# Patient Record
Sex: MALE | NOT HISPANIC OR LATINO | Employment: FULL TIME | URBAN - METROPOLITAN AREA
[De-identification: names, ages, dates, MRNs, and addresses within clinical notes are randomized per-mention and may not be internally consistent; named-entity substitution may affect disease eponyms.]

---

## 2020-07-15 ENCOUNTER — TRANSCRIBE ORDERS (OUTPATIENT)
Dept: PHYSICAL THERAPY | Facility: CLINIC | Age: 40
End: 2020-07-15

## 2020-07-15 DIAGNOSIS — S46.912A STRAIN OF LEFT SHOULDER, INITIAL ENCOUNTER: Primary | ICD-10-CM

## 2020-07-20 ENCOUNTER — TREATMENT (OUTPATIENT)
Dept: PHYSICAL THERAPY | Facility: CLINIC | Age: 40
End: 2020-07-20

## 2020-07-20 DIAGNOSIS — S46.912A STRAIN OF LEFT SHOULDER, INITIAL ENCOUNTER: ICD-10-CM

## 2020-07-20 DIAGNOSIS — M25.512 ACUTE PAIN OF LEFT SHOULDER: Primary | ICD-10-CM

## 2020-07-20 PROCEDURE — 97161 PT EVAL LOW COMPLEX 20 MIN: CPT | Performed by: PHYSICAL THERAPIST

## 2020-07-20 PROCEDURE — 97014 ELECTRIC STIMULATION THERAPY: CPT | Performed by: PHYSICAL THERAPIST

## 2020-07-20 PROCEDURE — 97530 THERAPEUTIC ACTIVITIES: CPT | Performed by: PHYSICAL THERAPIST

## 2020-07-20 NOTE — PROGRESS NOTES
Physical Therapy Initial Evaluation and Plan of Care      Patient: Emanuel Valdes   : 1980  Diagnosis/ICD-10 Code:  Acute pain of left shoulder [M25.512]  Referring practitioner: MANUEL Ramirez  Date of Initial Visit: 2020  Today's Date: 2020  Patient seen for 1 sessions           Subjective Questionnaire: QuickDASH: 75%      Subjective Evaluation    History of Present Illness  Date of onset: 2020  Mechanism of injury: He was lifting a 30 lb canister at work; had to swat a wasp with his right hand, and the canister yanked his left hand down and behind his back. Pain starts in shoulder and goes into neck and shoots down into arm and hand; fingers tingle and go numb. Doesn't hurt too bad just sitting here, but it hurts as soon as he moves his arm. He has difficulty with daily activities like pushing/pulling a door or driving. He is unable to reach overhead. Taking muscle relaxer and acetaminophen.      Patient Occupation:    Precautions and Work Restrictions: Has not been back to work yetQuality of life: good    Pain  Current pain ratin  At best pain ratin  At worst pain rating: 10  Location: shoulder, radiates down into fingers  Quality: burning, radiating and sharp (tingling; feels like it's on fire)  Relieving factors: change in position and heat  Aggravating factors: lifting, overhead activity, prolonged positioning and sleeping (pulling)  Progression: no change    Hand dominance: right    Diagnostic Tests  X-ray: abnormal (possible muscle sprain)    Patient Goals  Patient goals for therapy: decreased pain and return to work  Patient goal: Be able to move arm without it hurting           Objective          Static Posture     Head  Forward.    Shoulders  Elevated and rounded.    Scapulae  Left elevated.    Palpation   Left   Hypertonic in the cervical paraspinals, deltoid, levator scapulae, pectoralis major, scalenes, suboccipitals and upper trapezius.    Tenderness of the cervical paraspinals, deltoid, levator scapulae, pectoralis major, scalenes, suboccipitals and upper trapezius.     Right   Hypertonic in the cervical paraspinals, levator scapulae, scalenes, suboccipitals and upper trapezius. Tenderness of the cervical paraspinals, levator scapulae, scalenes, suboccipitals and upper trapezius.     Active Range of Motion   Cervical/Thoracic Spine   Cervical    Flexion: WFL  Extension: with pain  Left Shoulder   Flexion: 82 degrees with pain  Extension: 36 degrees with pain  Abduction: 81 degrees with pain  External rotation 0°: 25 degrees     Right Shoulder   Flexion: 179 degrees   Extension: 46 degrees   Abduction: 169 degrees   External rotation 90°: 76 degrees  Internal rotation 90°: 49 degrees           Assessment & Plan     Assessment  Impairments: abnormal muscle tone, abnormal or restricted ROM, activity intolerance and pain with function  Assessment details: Patient is a 39 y.o male presenting with (L) shoulder pain that radiates into his arm and hand, causing paresthesia in his fingers. He reports symptoms 2-10/10, aggravated by any use of his (L) arm in daily activities. He has limited (L) shoulder AROM in all planes, often limited by pain. He exhibits significant hypertonicity of (B) suboccipitals and cervical paraspinals, and is most tender over the (L) UT and LS. Patient's goals for therapy are to decrease pain and regain use of (L) UE in order to return to work. He will benefit from physical therapy to address these deficits and regain functional use of his (L) arm.  Functional Limitations: carrying objects, lifting, sleeping, pulling, pushing, moving in bed, reaching overhead and unable to perform repetitive tasks  Goals  Plan Goals: STG (to be completed in 4 weeks):  1. Patient will be independent with initial HEP.   2. Patient's (L) shoulder AROM will increase to 120 degrees of flexion and abduction without pain to improve use of his (L) UE in  overhead movements.  3. Patient's QuickDASH score will improve by 25% for subjective evidence of symptom reduction.  4. Patient's symptoms of paresthesia will improve by 75% for increased use of his (L) UE in daily activities.  5. Patient's cervical and UE hypertonicity will improve by 50% for evidence of decreased muscle guarding and increased mobility.    LTG (to be completed in 6 weeks):  1. Patient will be independent with progressed strengthening HEP.  2. Patient's (L) shoulder AROM will be WFL without pain in all planes for increased functional use of (L) UE.  3. 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    Plan  Therapy options: will be seen for skilled physical therapy services  Planned modality interventions: cryotherapy, electrical stimulation/Russian stimulation, thermotherapy (hydrocollator packs) and ultrasound  Planned therapy interventions: ADL retraining, body mechanics training, functional ROM exercises, home exercise program, joint mobilization, manual therapy, motor coordination training, fine motor coordination training, postural training, soft tissue mobilization, strengthening, stretching and therapeutic activities  Duration in visits: 14  Treatment plan discussed with: patient        Manual Therapy:         mins  12324;  Therapeutic Exercise:         mins  34367;     Neuromuscular Marti:        mins  30592;    Therapeutic Activity:     12     mins  81572;     Gait Training:           mins  78506;     Ultrasound:          mins  28840;    Electrical Stimulation:    15     mins  39584 ( );  Dry Needling          mins self-pay    Timed Treatment:   12   mins   Total Treatment:     62   mins    PT SIGNATURE: Vidya Gillespie, PT   DATE TREATMENT INITIATED: 7/20/2020    Initial Certification  Certification Period: 10/18/2020  I certify that the therapy services are furnished while this patient is under my  care.  The services outlined above are required by this patient, and will be reviewed every 90 days.     PHYSICIAN: Bekah Norris PA      DATE:     Please sign and return via fax to [phone].. Thank you, [facility].

## 2020-07-21 ENCOUNTER — TREATMENT (OUTPATIENT)
Dept: PHYSICAL THERAPY | Facility: CLINIC | Age: 40
End: 2020-07-21

## 2020-07-21 DIAGNOSIS — M25.512 ACUTE PAIN OF LEFT SHOULDER: Primary | ICD-10-CM

## 2020-07-21 DIAGNOSIS — S46.912A STRAIN OF LEFT SHOULDER, INITIAL ENCOUNTER: ICD-10-CM

## 2020-07-21 PROCEDURE — 97014 ELECTRIC STIMULATION THERAPY: CPT | Performed by: PHYSICAL THERAPIST

## 2020-07-21 PROCEDURE — 97110 THERAPEUTIC EXERCISES: CPT | Performed by: PHYSICAL THERAPIST

## 2020-07-21 NOTE — PROGRESS NOTES
Physical Therapy Daily Progress Note      Visit # 2      Subjective Evaluation    History of Present Illness    Subjective comment: Pt reports that his pain level is 2/10 when he is not using his (L) arm, but shoots up to 7/10 when he does.       Objective   See Exercise, Manual, and Modality Logs for complete treatment.   Added UT/LS stretch, Scapular retraction, Post capsule stretch, and Table slide    Assessment & Plan     Assessment  Assessment details: Pt completed treatment with increased pain from all prescribed exercises.  He was advised to use a pain free ROM for his exercises and was able to finish the exercises.  He responded well to heat and E-stim.                     Manual Therapy:    0     mins  70119;  Therapeutic Exercise:    25     mins  18638;     Neuromuscular Marti:    0    mins  51668;    Therapeutic Activity:     0     mins  65324;     Gait Trainin     mins  47757;     Ultrasound:     0     mins  80419;    Work Hardening           0      mins 05000  Iontophoresis               0   mins 90620    Timed Treatment:   25   mins   Total Treatment:     40   mins    Dexter Medrano PTA  Physical Therapist

## 2020-07-28 ENCOUNTER — TREATMENT (OUTPATIENT)
Dept: PHYSICAL THERAPY | Facility: CLINIC | Age: 40
End: 2020-07-28

## 2020-07-28 DIAGNOSIS — S46.912A STRAIN OF LEFT SHOULDER, INITIAL ENCOUNTER: ICD-10-CM

## 2020-07-28 DIAGNOSIS — M25.512 ACUTE PAIN OF LEFT SHOULDER: Primary | ICD-10-CM

## 2020-07-28 PROCEDURE — 97110 THERAPEUTIC EXERCISES: CPT | Performed by: PHYSICAL THERAPIST

## 2020-07-28 PROCEDURE — 97014 ELECTRIC STIMULATION THERAPY: CPT | Performed by: PHYSICAL THERAPIST

## 2020-07-28 NOTE — PROGRESS NOTES
Physical Therapy Daily Progress Note      Visit # 3      Subjective Evaluation    History of Present Illness    Subjective comment: Patient reports that he is feeling approximately 50-60% better and able to do light tasks throughout the day.  Still unable to do heavier tasks.        Objective   See Exercise, Manual, and Modality Logs for complete treatment.       Assessment & Plan     Assessment  Assessment details: Patient tolerated treatment well.  Continues to have limited AROM above shoulder and 45 degrees ER at neutral.      Plan  Plan details: Progress as tolerated.                      Manual Therapy:    0     mins  71740;  Therapeutic Exercise:    45     mins  03238;     Neuromuscular Marti:    0    mins  39438;    Therapeutic Activity:     0     mins  45191;     Gait Trainin     mins  81727;     Ultrasound:     0     mins  98677;    Work Hardening           0      mins 55147  Iontophoresis               0   mins 22097    Timed Treatment:   45   mins   Total Treatment:     60   mins    Vidya Gillespie, PT  Physical Therapist

## 2020-08-11 ENCOUNTER — TREATMENT (OUTPATIENT)
Dept: PHYSICAL THERAPY | Facility: CLINIC | Age: 40
End: 2020-08-11

## 2020-08-11 DIAGNOSIS — M25.512 ACUTE PAIN OF LEFT SHOULDER: Primary | ICD-10-CM

## 2020-08-11 DIAGNOSIS — S46.912A STRAIN OF LEFT SHOULDER, INITIAL ENCOUNTER: ICD-10-CM

## 2020-08-11 PROCEDURE — 97110 THERAPEUTIC EXERCISES: CPT | Performed by: PHYSICAL THERAPIST

## 2020-08-11 PROCEDURE — 97140 MANUAL THERAPY 1/> REGIONS: CPT | Performed by: PHYSICAL THERAPIST

## 2020-08-18 ENCOUNTER — TREATMENT (OUTPATIENT)
Dept: PHYSICAL THERAPY | Facility: CLINIC | Age: 40
End: 2020-08-18

## 2020-08-18 DIAGNOSIS — M25.512 ACUTE PAIN OF LEFT SHOULDER: Primary | ICD-10-CM

## 2020-08-18 DIAGNOSIS — S46.912A STRAIN OF LEFT SHOULDER, INITIAL ENCOUNTER: ICD-10-CM

## 2020-08-18 PROCEDURE — 97110 THERAPEUTIC EXERCISES: CPT | Performed by: PHYSICAL THERAPIST

## 2020-08-18 NOTE — PROGRESS NOTES
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       Objective   See Exercise, Manual, and Modality Logs for complete treatment.   Added Rhomboid stretch/ Tband row and shoulder flex/scaption     Assessment & Plan     Assessment  Assessment details: Pt completed treatment with increasing discomfort in (L) shoulder as it fatigued.    He tolerated the introduction of resisted rows without issue.  He is still hypertonic in upper thoracic and shoulder which limits his ROM.  He will benefit from continued therapy to increase his ROM.                     Manual Therapy:    0     mins  25852;  Therapeutic Exercise:    32     mins  84722;     Neuromuscular Marti:    0    mins  90128;    Therapeutic Activity:     0     mins  68826;     Gait Trainin     mins  96515;     Ultrasound:     0     mins  76832;    Work Hardening           0      mins 49777  Iontophoresis               0   mins 12394    Timed Treatment:   32   mins   Total Treatment:     32   mins    Dexter Medrano PTA  Physical Therapist

## 2020-08-25 ENCOUNTER — TREATMENT (OUTPATIENT)
Dept: PHYSICAL THERAPY | Facility: CLINIC | Age: 40
End: 2020-08-25

## 2020-08-25 DIAGNOSIS — M25.512 ACUTE PAIN OF LEFT SHOULDER: Primary | ICD-10-CM

## 2020-08-25 DIAGNOSIS — S46.912A STRAIN OF LEFT SHOULDER, INITIAL ENCOUNTER: ICD-10-CM

## 2020-08-25 PROCEDURE — 97110 THERAPEUTIC EXERCISES: CPT | Performed by: PHYSICAL THERAPIST

## 2020-08-25 NOTE — PROGRESS NOTES
Physical Therapy Daily Progress Note      Visit # 6      Subjective Evaluation    History of Present Illness    Subjective comment: Pt reports that he has been referred for a MRI  for his (L) shoulder.       Objective   See Exercise, Manual, and Modality Logs for complete treatment.   Added Prone I    Assessment & Plan     Assessment  Assessment details: Pt's pain level alka from 2/10 at start of treatment to 5/10.  Scapular exercises  are relatively pain free.  Inferior and posterior capsule stretches are most painful.                     Manual Therapy:    0     mins  92452;  Therapeutic Exercise:    32     mins  59020;     Neuromuscular Marti:    0    mins  74975;    Therapeutic Activity:     0     mins  56659;     Gait Trainin     mins  56734;     Ultrasound:     0     mins  94412;    Work Hardening           0      mins 59660  Iontophoresis               0   mins 63752    Timed Treatment:   32   mins   Total Treatment:     32   mins    Dexter Medrano PTA  Physical Therapist

## 2020-09-08 ENCOUNTER — TREATMENT (OUTPATIENT)
Dept: PHYSICAL THERAPY | Facility: CLINIC | Age: 40
End: 2020-09-08

## 2020-09-08 DIAGNOSIS — S46.912A STRAIN OF LEFT SHOULDER, INITIAL ENCOUNTER: ICD-10-CM

## 2020-09-08 DIAGNOSIS — M25.512 ACUTE PAIN OF LEFT SHOULDER: Primary | ICD-10-CM

## 2020-09-08 PROCEDURE — 97110 THERAPEUTIC EXERCISES: CPT | Performed by: PHYSICAL THERAPIST

## 2020-09-08 NOTE — PROGRESS NOTES
Physical Therapy Daily Progress Note      Visit # 7      Subjective Evaluation    History of Present Illness    Subjective comment: Patient reports that his shoulder is about the same and does not feel any better. States that since yesterday when he was doing his exericses it feels worse.  He is scheduled to have an MRI next week.        Objective   See Exercise, Manual, and Modality Logs for complete treatment.       Assessment & Plan     Assessment  Assessment details: Patient tolerated treatment fair.  He continues to have decreased ROM with inferior and posterior capsular tightness.  ROM Slightly improved.  Increased soreness following treatment.     Plan  Plan details: Progress as tolerated.                      Manual Therapy:    0     mins  16572;  Therapeutic Exercise:    45     mins  38375;     Neuromuscular Marti:    0    mins  69491;    Therapeutic Activity:     0     mins  69256;     Gait Trainin     mins  23927;     Ultrasound:     0     mins  38500;    Work Hardening           0      mins 60127  Iontophoresis               0   mins 30616    Timed Treatment:   45   mins   Total Treatment:     55   mins    Vidya Gillespie, PT  Physical Therapist

## 2020-09-11 ENCOUNTER — TREATMENT (OUTPATIENT)
Dept: PHYSICAL THERAPY | Facility: CLINIC | Age: 40
End: 2020-09-11

## 2020-09-11 DIAGNOSIS — S46.912A STRAIN OF LEFT SHOULDER, INITIAL ENCOUNTER: ICD-10-CM

## 2020-09-11 DIAGNOSIS — M25.512 ACUTE PAIN OF LEFT SHOULDER: Primary | ICD-10-CM

## 2020-09-11 PROCEDURE — 97110 THERAPEUTIC EXERCISES: CPT | Performed by: PHYSICAL THERAPIST

## 2020-09-11 NOTE — PROGRESS NOTES
Physical Therapy Daily Progress Note      Visit # 8      Subjective Evaluation    History of Present Illness    Subjective comment: Pt reports his shoulder has been very painful grabbing a falling cup over the weekend.       Objective   See Exercise, Manual, and Modality Logs for complete treatment.       Assessment & Plan     Assessment  Assessment details: Pt completed treatment with increased pain in (L) shoulder.  Posterior and inferior capsule stretches were too painful to complete.  Progress is limited by continued high pain level in shoulder.                      Manual Therapy:    0     mins  32206;  Therapeutic Exercise:    30     mins  85859;     Neuromuscular Marti:    0    mins  30871;    Therapeutic Activity:     0     mins  82230;     Gait Trainin     mins  94943;     Ultrasound:     0     mins  00068;    Work Hardening           0      mins 07171  Iontophoresis               0   mins 12816    Timed Treatment:   30   mins   Total Treatment:     30   mins    Dexter Medrano PTA  Physical Therapist

## 2020-09-17 ENCOUNTER — TREATMENT (OUTPATIENT)
Dept: PHYSICAL THERAPY | Facility: CLINIC | Age: 40
End: 2020-09-17

## 2020-09-17 DIAGNOSIS — M25.512 ACUTE PAIN OF LEFT SHOULDER: Primary | ICD-10-CM

## 2020-09-17 DIAGNOSIS — S46.912A STRAIN OF LEFT SHOULDER, INITIAL ENCOUNTER: ICD-10-CM

## 2020-09-17 PROCEDURE — 97110 THERAPEUTIC EXERCISES: CPT | Performed by: PHYSICAL THERAPIST

## 2020-09-17 NOTE — PROGRESS NOTES
"Physical Therapy Daily Progress Note      Visit # 9      Subjective Evaluation    History of Present Illness    Subjective comment: Pt reports that he had his MRI on Monday, but has not got the results yet.       Objective   See Exercise, Manual, and Modality Logs for complete treatment.       Assessment & Plan     Assessment  Assessment details: Pt tolerated treatment with slight increase in shoulder pain.  He displayed visible signs of pain on several exercises.  When asked it he had pain he replied \"a little\".  He had no issues with introduction of hitch hiker and TB pull aparts.                     Manual Therapy:    0     mins  38259;  Therapeutic Exercise:    41     mins  76180;     Neuromuscular Marti:    0    mins  78665;    Therapeutic Activity:     0     mins  24450;     Gait Trainin     mins  41767;     Ultrasound:     0     mins  14415;    Work Hardening           0      mins 06980  Iontophoresis               0   mins 67025    Timed Treatment:   41   mins   Total Treatment:     41   mins    Dexter Medrano PTA  Physical Therapist  "

## 2020-09-22 ENCOUNTER — TREATMENT (OUTPATIENT)
Dept: PHYSICAL THERAPY | Facility: CLINIC | Age: 40
End: 2020-09-22

## 2020-09-22 DIAGNOSIS — S46.912A STRAIN OF LEFT SHOULDER, INITIAL ENCOUNTER: ICD-10-CM

## 2020-09-22 DIAGNOSIS — M25.512 ACUTE PAIN OF LEFT SHOULDER: Primary | ICD-10-CM

## 2020-09-22 PROCEDURE — 97110 THERAPEUTIC EXERCISES: CPT | Performed by: PHYSICAL THERAPIST

## 2020-09-22 NOTE — PROGRESS NOTES
Physical Therapy Daily Progress Note      Visit # 10      Subjective Evaluation    History of Present Illness    Subjective comment: Pt reports that the back of his (L) shoulder has been sore since he had his MRI last week.       Objective          Active Range of Motion   Left Shoulder   Flexion: 105 degrees with pain  Abduction: 94 degrees with pain  External rotation 90°: 34 degrees with pain  Internal rotation 90°: 37 degrees with pain      See Exercise, Manual, and Modality Logs for complete treatment.       Assessment & Plan     Assessment  Assessment details: Pt completed treatment with c/o mild pain on all exercises. Pt's AROM still very limited.  Progression has been limited by  continued pain at end ranges.                     Manual Therapy:    0     mins  99173;  Therapeutic Exercise:    38     mins  12249;     Neuromuscular Marti:    0    mins  06353;    Therapeutic Activity:     0     mins  13834;     Gait Trainin     mins  34121;     Ultrasound:     0     mins  44091;    Work Hardening           0      mins 56957  Iontophoresis               0   mins 05875  E-Stim                          _0_ mins 45139 ( )    Timed Treatment:   38   mins   Total Treatment:     38   mins    Dexter Medrano PTA  Physical Therapist Assistant

## 2020-09-29 ENCOUNTER — TREATMENT (OUTPATIENT)
Dept: PHYSICAL THERAPY | Facility: CLINIC | Age: 40
End: 2020-09-29

## 2020-09-29 DIAGNOSIS — M25.512 ACUTE PAIN OF LEFT SHOULDER: Primary | ICD-10-CM

## 2020-09-29 DIAGNOSIS — S46.912A STRAIN OF LEFT SHOULDER, INITIAL ENCOUNTER: ICD-10-CM

## 2020-09-29 PROCEDURE — 97110 THERAPEUTIC EXERCISES: CPT | Performed by: PHYSICAL THERAPIST

## 2020-09-29 NOTE — PROGRESS NOTES
Physical Therapy Daily Progress Note      Visit # 11      Subjective Evaluation    History of Present Illness    Subjective comment: Pt reports that he has been referred to a specialist for adhesive capsulitis.       Objective   See Exercise, Manual, and Modality Logs for complete treatment.       Assessment & Plan     Assessment  Assessment details: Pt completed treatment with increased pain for all exercises.  Discussed with pt the importance of keeping his shoulder moving no matter how bad it hurts.  Will continue to progress as tolerated.                     Manual Therapy:    0     mins  09892;  Therapeutic Exercise:    40     mins  30900;     Neuromuscular Marti:    0    mins  81680;    Therapeutic Activity:     0     mins  87330;     Gait Trainin     mins  84419;     Ultrasound:     0     mins  77033;    Work Hardening           0      mins 89168  Iontophoresis               0   mins 21859  E-Stim                          _0_ mins 57263 ( )    Timed Treatment:   40   mins   Total Treatment:     40   mins    Dexter Medrano PTA  Physical Therapist Assistant

## 2020-10-01 ENCOUNTER — TREATMENT (OUTPATIENT)
Dept: PHYSICAL THERAPY | Facility: CLINIC | Age: 40
End: 2020-10-01

## 2020-10-01 DIAGNOSIS — S46.912A STRAIN OF LEFT SHOULDER, INITIAL ENCOUNTER: ICD-10-CM

## 2020-10-01 DIAGNOSIS — M25.512 ACUTE PAIN OF LEFT SHOULDER: Primary | ICD-10-CM

## 2020-10-01 PROCEDURE — 97110 THERAPEUTIC EXERCISES: CPT | Performed by: PHYSICAL THERAPIST

## 2020-10-01 NOTE — PROGRESS NOTES
Physical Therapy Daily Progress Note      Visit # 12      Subjective Evaluation    History of Present Illness    Subjective comment: Pt reports current pain level of 2/10.       Objective   See Exercise, Manual, and Modality Logs for complete treatment.       Assessment & Plan     Assessment  Assessment details: Pt tolerated treatment well.  He continues to have increased pain as treatment progresses.  Pt benefits from reminders to not compensate by raising his shoulder during exercises.                     Manual Therapy:    0     mins  37606;  Therapeutic Exercise:    42     mins  43298;     Neuromuscular Marti:    0    mins  88677;    Therapeutic Activity:     0     mins  32209;     Gait Trainin     mins  60768;     Ultrasound:     0     mins  68949;    Work Hardening           0      mins 31797  Iontophoresis               0   mins 67113  E-Stim                          _0_ mins 32760 ( )    Timed Treatment:   42   mins   Total Treatment:     42   mins    Dexter Medrano PTA  Physical Therapist Assistant

## 2020-10-05 ENCOUNTER — TREATMENT (OUTPATIENT)
Dept: PHYSICAL THERAPY | Facility: CLINIC | Age: 40
End: 2020-10-05

## 2020-10-05 DIAGNOSIS — S46.912A STRAIN OF LEFT SHOULDER, INITIAL ENCOUNTER: ICD-10-CM

## 2020-10-05 DIAGNOSIS — M25.512 ACUTE PAIN OF LEFT SHOULDER: Primary | ICD-10-CM

## 2020-10-05 PROCEDURE — 97110 THERAPEUTIC EXERCISES: CPT | Performed by: PHYSICAL THERAPIST

## 2020-10-05 NOTE — PROGRESS NOTES
Re-Assessment / Re-Certification      Patient: Emanuel Valdes   : 1980  Diagnosis/ICD-10 Code:  Acute pain of left shoulder [M25.512]  Referring practitioner: MANUEL Ramirez  Today's Date: 10/5/2020  Patient seen for 13 sessions      Subjective:   Emanuel Valdes reports: that the shoulder is better overall, rates the pain at 2/10.    Treatment has included: therapeutic exercise, electrical stimulation and moist heat    Subjective   Objective          Active Range of Motion   Left Shoulder   Flexion: 103 degrees   Abduction: 78 degrees   External rotation 0°: 11 degrees   Internal rotation BTB: Active internal rotation behind the back: to left lateral hip.       Assessment & Plan     Assessment  Impairments: abnormal or restricted ROM, activity intolerance, lacks appropriate home exercise program and pain with function  Assessment details: Patient presents with c/o pain and limited AROM which is limiting his ability to perform full job duties and ADL'S.    Prognosis details: STG's   1)  Independent with HEP  2)  Decrease pain by 50% or more  3)  AROM left shoulder flexion to 115  4)  AROM left shoulder abd to 90    LTG's  1)  Independent with HEP progression  2)  Decrease pain by 75% or more  3)  AROM left shoulder flexion to 145  4)  AROM left shoulder abduction to 115  5)  AROM left shoulder ER to 40       Plan  Therapy options: will be seen for skilled physical therapy services  Planned therapy interventions: stretching, therapeutic activities, home exercise program and strengthening        PT Signature: Edgardo Wilkinson, PT      Manual Therapy:    0     mins  46873;  Therapeutic Exercise:    38     mins  40181;     Neuromuscular Marti:    0    mins  38046;    Therapeutic Activity:     0     mins  03980;     Gait Trainin     mins  63731;     Ultrasound:     0     mins  17178;    Work Hardening           0      mins 00835  Iontophoresis               0   mins 46936    Timed Treatment:   38   mins    Total Treatment:     38   mins

## 2020-10-13 ENCOUNTER — TREATMENT (OUTPATIENT)
Dept: PHYSICAL THERAPY | Facility: CLINIC | Age: 40
End: 2020-10-13

## 2020-10-13 DIAGNOSIS — M25.512 ACUTE PAIN OF LEFT SHOULDER: Primary | ICD-10-CM

## 2020-10-13 DIAGNOSIS — S46.912A STRAIN OF LEFT SHOULDER, INITIAL ENCOUNTER: ICD-10-CM

## 2020-10-13 PROCEDURE — 97110 THERAPEUTIC EXERCISES: CPT | Performed by: PHYSICAL THERAPIST

## 2020-10-13 NOTE — PROGRESS NOTES
Physical Therapy Daily Progress Note      Visit # 14      Subjective Evaluation    History of Present Illness    Subjective comment: Pt reports that his shoulder hurts all the time, but he has been doing his HEP.       Objective   See Exercise, Manual, and Modality Logs for complete treatment.   Added back circles, adrian elmore    Assessment & Plan     Assessment  Assessment details: Pt tolerated treatment with increased levels of pain with all movements.  Pt's AROM is still very limited by pain. Pt reports that he has an appointment with an orthopedic on 10/26.                     Manual Therapy:    0     mins  79687;  Therapeutic Exercise:    23     mins  55770;     Neuromuscular Marti:    0    mins  23153;    Therapeutic Activity:     0     mins  53375;     Gait Trainin     mins  99886;     Ultrasound:     0     mins  30833;    Work Hardening           0      mins 75531  Iontophoresis               0   mins 52636  E-Stim                          _0_ mins 91562 ( )    Timed Treatment:   23   mins   Total Treatment:     23   mins    Dexter Medrano PTA  Physical Therapist Assistant

## 2020-10-26 ENCOUNTER — OFFICE VISIT (OUTPATIENT)
Dept: ORTHOPEDIC SURGERY | Facility: CLINIC | Age: 40
End: 2020-10-26

## 2020-10-26 VITALS — WEIGHT: 246 LBS | BODY MASS INDEX: 36.43 KG/M2 | HEIGHT: 69 IN | TEMPERATURE: 96 F

## 2020-10-26 DIAGNOSIS — M25.512 LEFT SHOULDER PAIN, UNSPECIFIED CHRONICITY: Primary | ICD-10-CM

## 2020-10-26 DIAGNOSIS — M75.02 ADHESIVE CAPSULITIS OF LEFT SHOULDER: ICD-10-CM

## 2020-10-26 PROCEDURE — 73030 X-RAY EXAM OF SHOULDER: CPT | Performed by: ORTHOPAEDIC SURGERY

## 2020-10-26 PROCEDURE — 20610 DRAIN/INJ JOINT/BURSA W/O US: CPT | Performed by: ORTHOPAEDIC SURGERY

## 2020-10-26 PROCEDURE — 99204 OFFICE O/P NEW MOD 45 MIN: CPT | Performed by: ORTHOPAEDIC SURGERY

## 2020-10-26 RX ORDER — METHYLPREDNISOLONE ACETATE 80 MG/ML
80 INJECTION, SUSPENSION INTRA-ARTICULAR; INTRALESIONAL; INTRAMUSCULAR; SOFT TISSUE
Status: COMPLETED | OUTPATIENT
Start: 2020-10-26 | End: 2020-10-26

## 2020-10-26 RX ADMIN — METHYLPREDNISOLONE ACETATE 80 MG: 80 INJECTION, SUSPENSION INTRA-ARTICULAR; INTRALESIONAL; INTRAMUSCULAR; SOFT TISSUE at 12:33

## 2020-10-26 NOTE — PROGRESS NOTES
New Left Shoulder      Patient: Emanuel Valdes        YOB: 1980    Medical Record Number: [id number]        Chief Complaints: left shoulder pain      History of Present Illness: This is a 40-year-old male who is right-hand-dominant presents with left shoulder injury that happened 3 months ago.  He was loading propane tanks onto a front  he had a full one in his left hand when nest of wasp fluid him he swung the propane tank back behind him still hanging onto the left shoulder.  To progressive worsening of his symptoms loss of range of motion his current symptoms are shooting grinding they are severe nearly constant but worse with activity somewhat better with rest he does have night pain past medical history is marked for diabetes      Allergies: No Known Allergies    Medications:   Home Medications:  Current Outpatient Medications on File Prior to Visit   Medication Sig   • diclofenac (VOLTAREN) 50 MG EC tablet TK 1 T PO Q 8 H WITH FOOD     No current facility-administered medications on file prior to visit.      Current Medications:  Scheduled Meds:  Continuous Infusions:No current facility-administered medications for this visit.     PRN Meds:.    History reviewed. No pertinent past medical history.   History reviewed. No pertinent surgical history.     Social History     Occupational History   • Not on file   Tobacco Use   • Smoking status: Never Smoker   • Smokeless tobacco: Never Used   Substance and Sexual Activity   • Alcohol use: Not Currently   • Drug use: Never   • Sexual activity: Not on file      Social History     Social History Narrative   • Not on file      History reviewed. No pertinent family history.          Review of Systems: 14 point review of systems are remarkable for shoulder pain only the remainder negative per the patient    Review of Systems      Physical Exam: 40 y.o. male  General Appearance:    Alert, cooperative, in no acute distress                  "  Vitals:    10/26/20 1209   Temp: 96 °F (35.6 °C)   TempSrc: Temporal   Weight: 112 kg (246 lb)   Height: 175.3 cm (69\")   PainSc:   4      Patient is alert and read ×3 no acute distress appears her above-listed at height weight and age.  Affect is normal respiratory rate is normal unlabored. Heart rate regular rate rhythm, sclera, dentition and hearing are normal for the purpose of this exam.    Ortho Exam Physical exam of the left shoulder reveals no overlying skin changes no lymphedema no lymphadenopathy.  Patient has active flexion 150 with mild symptoms passively I can get them to about 160 abduction is similar external rotation is to 0 and internal rotation to their buttocks with  symptoms.  Patient has good rotator cuff strength 4+ over 5 with isometric strength testing with pain.  Patient has a positive impingement and a positive Moran sign.  Patient has good cervical range of motion which is full and asymptomatic no radicular symptoms.  Patient has a normal elbow exam.  Good distal pulses are present    Large Joint Arthrocentesis: L glenohumeral  Date/Time: 10/26/2020 12:33 PM  Consent given by: patient  Site marked: site marked  Timeout: Immediately prior to procedure a time out was called to verify the correct patient, procedure, equipment, support staff and site/side marked as required   Supporting Documentation  Indications: pain   Procedure Details  Location: shoulder - L glenohumeral  Preparation: Patient was prepped and draped in the usual sterile fashion  Needle gauge: 21.  Approach: anterolateral  Medications administered: 4 mL lidocaine (cardiac); 80 mg methylPREDNISolone acetate 80 MG/ML  Patient tolerance: patient tolerated the procedure well with no immediate complications                Radiology:   AP, Scapular Y and Axillary Lateral of the left shoulder were ordered/reviewed to evauate shoulder pain.  I have no comparative films he has some mild acromioclavicular arthritis and some subtle " sclerosis at the insertion the cuff otherwise no acute bony pathology he also comes with an MRI which I have reviewed which show some tendinitis of rotator cuff as well as some acromioclavicular arthritis no other acute pathology  Imaging Results (Most Recent)     Procedure Component Value Units Date/Time    XR Shoulder 2+ View Left [[id number]] Resulted: 10/26/20 1202     Updated: 10/26/20 1202    Impression:      Ordering physician's impression is located in the Encounter Note dated 10/26/20. X-ray performed in the DR room.          Assessment/Plan: Left shoulder pain I think this is a straightforward frozen shoulder which I discussed with in detail I think he benefit from a glenohumeral injection he is done some physical therapy but I think an injection in the therapy would help.  I will have him off work today and then continue his limited work  Cortisone Injection. See procedure note.  Cortisone Injection for DIAGNOSTIC and THERAPUTIC purposes.

## 2020-11-03 ENCOUNTER — TREATMENT (OUTPATIENT)
Dept: PHYSICAL THERAPY | Facility: CLINIC | Age: 40
End: 2020-11-03

## 2020-11-03 DIAGNOSIS — M75.02 ADHESIVE CAPSULITIS OF LEFT SHOULDER: Primary | ICD-10-CM

## 2020-11-03 PROCEDURE — 97110 THERAPEUTIC EXERCISES: CPT | Performed by: PHYSICAL THERAPIST

## 2020-11-03 NOTE — PROGRESS NOTES
Re-Assessment / Re-Certification    Time In 344     Patient: Emanuel Valdes   : 1980  Diagnosis/ICD-10 Code:  Adhesive capsulitis of left shoulder [M75.02]  Referring practitioner: Kelly Starks MD  Today's Date: 11/3/2020  Patient seen for 15 sessions      Subjective:       Subjective Evaluation    History of Present Illness  Mechanism of injury: See prev eval.  Patient saw Dr Starks who injected the shoulder.    Pain  Current pain rating: 3  At worst pain rating: 10  Location: left lateral shoulder and arm         Objective          Active Range of Motion   Left Shoulder   Flexion: 100 degrees   Abduction: 75 degrees   External rotation 0°: 26 degrees   Internal rotation BTB: Active internal rotation behind the back: to left glut.     Strength/Myotome Testing     Left Shoulder     Planes of Motion   Flexion: 4   Abduction: 4+       Assessment & Plan     Assessment  Impairments: abnormal or restricted ROM, activity intolerance, impaired physical strength, lacks appropriate home exercise program and pain with function  Assessment details: Patient presents with c/o pain, limited AROM and decreased strength which is limiting his ability to perform full job duties.  Barriers to therapy: none  Prognosis: good  Prognosis details: STG's  1)  Independent with HEP  2)  Decrease pain by 50% or more  3)  AROM left shoulder flexion to 120  4)  AROM left shoulder abduction to 100    LTG's   1)  Independent with HEP progression  2)  Decrease pain by 75% or more  3)  Increase strength for the left shoulder to 4+/5  4)  AROM left shoulder flexion to 150  5)  AROM left shoulder abduction to 130  6)  AROM left shoulder ER to 50      Plan  Therapy options: will be seen for skilled physical therapy services  Planned therapy interventions: strengthening, stretching, therapeutic activities and home exercise program  Treatment plan discussed with: patient          PT Signature: Edgardo Wilkinson, PT          Manual Therapy:     0     mins  66681;  Therapeutic Exercise:    27     mins  69410;     Neuromuscular Marti:    0    mins  72162;    Therapeutic Activity:     0     mins  01011;     Gait Trainin     mins  65556;     Ultrasound:     0     mins  14350;    Work Hardening           0      mins 03234  Iontophoresis               0   mins 18504    Timed Treatment:   27   mins   Total Treatment:     27   mins

## 2020-11-10 ENCOUNTER — OFFICE VISIT (OUTPATIENT)
Dept: ORTHOPEDIC SURGERY | Facility: CLINIC | Age: 40
End: 2020-11-10

## 2020-11-10 ENCOUNTER — TELEPHONE (OUTPATIENT)
Dept: ORTHOPEDIC SURGERY | Facility: CLINIC | Age: 40
End: 2020-11-10

## 2020-11-10 VITALS — TEMPERATURE: 96.8 F | WEIGHT: 242 LBS | HEIGHT: 69 IN | BODY MASS INDEX: 35.84 KG/M2

## 2020-11-10 DIAGNOSIS — M75.02 ADHESIVE CAPSULITIS OF LEFT SHOULDER: Primary | ICD-10-CM

## 2020-11-10 PROCEDURE — 99213 OFFICE O/P EST LOW 20 MIN: CPT | Performed by: ORTHOPAEDIC SURGERY

## 2020-11-10 NOTE — TELEPHONE ENCOUNTER
Caller: LENKA    Relationship: [employer]     Best call back number: [phone]    What form or medical record are you requesting: [employer] NOTES    Who is requesting this form or medical record from you: [employer]    How would you like to receive the form or medical records (pick-up, mail, fax):   If fax, what is the fax number: [phone]      Timeframe paperwork needed: ASAP    Additional notes: LENKA FROM [facility] CALLED AND NEEDS UPDATED NOTES FOR FINN [facility]LANDO' LAST APPOINTMENT 11/10 AND NEEDS TO KNOW IF THERE WILL BE A FOLLOW UP. PLEASE FAX NOTES.

## 2020-11-10 NOTE — PROGRESS NOTES
Patient: Emanuel Valdes  YOB: 1980  Date of Service: 11/10/2020    Chief Complaints: Left shoulder pain    Subjective:    History of Present Illness: Pt is seen in the office today with complaints of left shoulder pain I last saw him he was felt to have frozen shoulder we did inject him he is done physical therapy is actually improving.  But only a little bit.  He still quite stuck despite the fact has been diligent with his physical therapy        Allergies: No Known Allergies    Medications:   Home Medications:  Current Outpatient Medications on File Prior to Visit   Medication Sig   • diclofenac (VOLTAREN) 50 MG EC tablet TK 1 T PO Q 8 H WITH FOOD     No current facility-administered medications on file prior to visit.      Current Medications:  Scheduled Meds:  Continuous Infusions:No current facility-administered medications for this visit.     PRN Meds:.    I have reviewed the patient's medical history in detail and updated the computerized patient record.  Review and summarization of old records include:    No past medical history on file.   No past surgical history on file.     Social History     Occupational History   • Not on file   Tobacco Use   • Smoking status: Never Smoker   • Smokeless tobacco: Never Used   Substance and Sexual Activity   • Alcohol use: Not Currently   • Drug use: Never   • Sexual activity: Not on file      Social History     Social History Narrative   • Not on file      No family history on file.    ROS: 14 point review of systems was performed and was negative except for documented findings in HPI and today's encounter.     Allergies: No Known Allergies  Constitutional:  Denies fever, shaking or chills   Eyes:  Denies change in visual acuity   HENT:  Denies nasal congestion or sore throat   Respiratory:  Denies cough or shortness of breath   Cardiovascular:  Denies chest pain or severe LE edema   GI:  Denies abdominal pain, nausea, vomiting, bloody stools or  "diarrhea   Musculoskeletal:  Numbness, tingling, or loss of motor function only as noted above in history of present illness.  : Denies painful urination or hematuria  Integument:  Denies rash, lesion or ulceration   Neurologic:  Denies headache or focal weakness  Endocrine:  Denies lymphadenopathy  Psych:  Denies confusion or change in mental status   Hem:  Denies active bleeding      Physical Exam: 40 y.o. male  Wt Readings from Last 3 Encounters:   10/26/20 112 kg (246 lb)       There is no height or weight on file to calculate BMI.  No height and weight on file for this encounter.  There were no vitals filed for this visit.  Vital signs reviewed.   General Appearance:    Alert, cooperative, in no acute distress                    Ortho exam      Physical exam of the left shoulder reveals no overlying skin changes no lymphedema no lymphadenopathy.  Patient has active flexion 150 with mild symptoms passively I can get them to about 160 abduction is similar external rotation is to 0 and internal rotation to their buttocks with  symptoms.  Patient has good rotator cuff strength 4+ over 5 with isometric strength testing with pain.  Patient has a positive impingement and a positive Moran sign.  Patient has good cervical range of motion which is full and asymptomatic no radicular symptoms.  Patient has a normal elbow exam.  Good distal pulses are present       Physical Exam: 40 y.o. male  General Appearance:    Alert, cooperative, in no acute distress                      Vitals:    11/10/20 1450   Temp: 96.8 °F (36 °C)   Weight: 110 kg (242 lb)   Height: 175.3 cm (69\")        Head:    Normocephalic, without obvious abnormality, atraumatic   Eyes:            conjunctivae and sclerae normal, no pallor, corneas clear,    Ears:    Ears appear intact with no abnormalities noted   Throat:   No oral lesions, no thrush, oral mucosa moist   Neck:   No adenopathy, supple, trachea midline, no thyromegaly,    Back:     No " kyphosis present, no scoliosis present, no skin lesions,      erythema or scars, no tenderness to percussion or                   palpation,   range of motion normal   Lungs:     Clear to auscultation,respirations regular, even and                  unlabored    Heart:    Regular rhythm and normal rate               Chest Wall:    No abnormalities observed               Pulses:   Pulses palpable and equal bilaterally   Skin:   No bleeding, bruising or rash   Lymph nodes:   No palpable adenopathy   Neurologic:   Appears neurologic intact           Assessment: Left shoulder adhesive capsulitis.  He does have an MRI which shows some tendinopathy the rotator cuff no obvious tear.  I think he clearly a frozen shoulder.  He is improved a very little bit with conservative management but still quite stuck I think the best thing to do for this patient is to proceed with a manipulation.  I have discussed this with him in detail including the risk of fracture and dislocation.    Plan: Plan is to proceed with manipulation and injection he understands will do therapy immediately after that he understands the inherent risk of fracture dislocation.  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  I will give him a new work note that eliminates that.  Follow up as indicated.  Ice, elevate, and rest as needed.  Discussed conservative measures of pain control including ice, bracing.  Also talked about the importance of strengthening and maintaining ideal body weight    Kelly Starks M.D.

## 2020-11-11 ENCOUNTER — TELEPHONE (OUTPATIENT)
Dept: ORTHOPEDIC SURGERY | Facility: CLINIC | Age: 40
End: 2020-11-11

## 2020-11-11 NOTE — TELEPHONE ENCOUNTER
Caller: LENKA ROBERTSON    Relationship: [employer]    Best call back number: [phone]    What form or medical record are you requesting: OFFICE NOTES FROM 11-10-20    Who is requesting this form or medical record from you: [phone]    How would you like to receive the form or medical records (pick-up, mail, fax):  ATTN: LENKA ROBERTSON  FAX: [phone]      Timeframe paperwork needed: AS SOON AS POSSIBLE.

## 2020-11-12 ENCOUNTER — TREATMENT (OUTPATIENT)
Dept: PHYSICAL THERAPY | Facility: CLINIC | Age: 40
End: 2020-11-12

## 2020-11-12 DIAGNOSIS — M25.512 ACUTE PAIN OF LEFT SHOULDER: ICD-10-CM

## 2020-11-12 DIAGNOSIS — M75.02 ADHESIVE CAPSULITIS OF LEFT SHOULDER: Primary | ICD-10-CM

## 2020-11-12 DIAGNOSIS — S46.912A STRAIN OF LEFT SHOULDER, INITIAL ENCOUNTER: ICD-10-CM

## 2020-11-12 PROCEDURE — 97110 THERAPEUTIC EXERCISES: CPT | Performed by: PHYSICAL THERAPIST

## 2020-11-12 NOTE — PROGRESS NOTES
Physical Therapy Daily Progress Note      Visit # 16      Subjective Evaluation    History of Present Illness    Subjective comment: Pt reports that he will have a manipulation under anesthesia next week, but has not been scheduled yet.       Objective   See Exercise, Manual, and Modality Logs for complete treatment.       Assessment & Plan     Assessment  Assessment details: Pt tolerate treatment fairly well.  He had some c/o of soreness in (L) shoulder after wall and table slides.  He was able to complete the rest of his program with no issues.                     Manual Therapy:    0     mins  71807;  Therapeutic Exercise:    27     mins  51937;     Neuromuscular Marti:    0    mins  15419;    Therapeutic Activity:     0     mins  69764;     Gait Trainin     mins  00004;     Ultrasound:     0     mins  87970;    Work Hardening           0      mins 69481  Iontophoresis               0   mins 77475  E-Stim                          _0_ mins 37243 ( )    Timed Treatment:   27   mins   Total Treatment:     27   mins    Dexter Medrano PTA  Physical Therapist Assistant

## 2020-11-30 ENCOUNTER — DOCUMENTATION (OUTPATIENT)
Dept: PHYSICAL THERAPY | Facility: CLINIC | Age: 40
End: 2020-11-30

## 2020-12-02 ENCOUNTER — TELEPHONE (OUTPATIENT)
Dept: ORTHOPEDIC SURGERY | Facility: CLINIC | Age: 40
End: 2020-12-02

## 2020-12-02 PROBLEM — M75.02 ADHESIVE CAPSULITIS OF LEFT SHOULDER: Status: ACTIVE | Noted: 2020-12-02

## 2020-12-04 ENCOUNTER — TRANSCRIBE ORDERS (OUTPATIENT)
Dept: PREADMISSION TESTING | Facility: HOSPITAL | Age: 40
End: 2020-12-04

## 2020-12-04 DIAGNOSIS — Z01.818 OTHER SPECIFIED PRE-OPERATIVE EXAMINATION: Primary | ICD-10-CM

## 2020-12-09 ENCOUNTER — TELEPHONE (OUTPATIENT)
Dept: ORTHOPEDIC SURGERY | Facility: CLINIC | Age: 40
End: 2020-12-09

## 2020-12-09 ENCOUNTER — APPOINTMENT (OUTPATIENT)
Dept: PREADMISSION TESTING | Facility: HOSPITAL | Age: 40
End: 2020-12-09

## 2020-12-09 VITALS
BODY MASS INDEX: 36.66 KG/M2 | DIASTOLIC BLOOD PRESSURE: 82 MMHG | OXYGEN SATURATION: 97 % | WEIGHT: 247.5 LBS | HEART RATE: 79 BPM | TEMPERATURE: 99.1 F | SYSTOLIC BLOOD PRESSURE: 144 MMHG | RESPIRATION RATE: 16 BRPM | HEIGHT: 69 IN

## 2020-12-09 DIAGNOSIS — Z86.39 HISTORY OF DIABETES MELLITUS: Primary | ICD-10-CM

## 2020-12-09 DIAGNOSIS — Z86.39 HISTORY OF DIABETES MELLITUS: ICD-10-CM

## 2020-12-09 LAB
ANION GAP SERPL CALCULATED.3IONS-SCNC: 11.3 MMOL/L (ref 5–15)
BUN SERPL-MCNC: 11 MG/DL (ref 6–20)
BUN/CREAT SERPL: 14.1 (ref 7–25)
CALCIUM SPEC-SCNC: 8.6 MG/DL (ref 8.6–10.5)
CHLORIDE SERPL-SCNC: 100 MMOL/L (ref 98–107)
CO2 SERPL-SCNC: 20.7 MMOL/L (ref 22–29)
CREAT SERPL-MCNC: 0.78 MG/DL (ref 0.76–1.27)
DEPRECATED RDW RBC AUTO: 36.7 FL (ref 37–54)
ERYTHROCYTE [DISTWIDTH] IN BLOOD BY AUTOMATED COUNT: 11.9 % (ref 12.3–15.4)
GFR SERPL CREATININE-BSD FRML MDRD: 110 ML/MIN/1.73
GFR SERPL CREATININE-BSD FRML MDRD: 133 ML/MIN/1.73
GLUCOSE SERPL-MCNC: 284 MG/DL (ref 65–99)
HBA1C MFR BLD: 13.4 % (ref 4.8–5.6)
HCT VFR BLD AUTO: 44.5 % (ref 37.5–51)
HGB BLD-MCNC: 14.7 G/DL (ref 13–17.7)
MCH RBC QN AUTO: 28.1 PG (ref 26.6–33)
MCHC RBC AUTO-ENTMCNC: 33 G/DL (ref 31.5–35.7)
MCV RBC AUTO: 84.9 FL (ref 79–97)
PLATELET # BLD AUTO: 284 10*3/MM3 (ref 140–450)
PMV BLD AUTO: 9.3 FL (ref 6–12)
POTASSIUM SERPL-SCNC: 3.7 MMOL/L (ref 3.5–5.2)
QT INTERVAL: 398 MS
RBC # BLD AUTO: 5.24 10*6/MM3 (ref 4.14–5.8)
SODIUM SERPL-SCNC: 132 MMOL/L (ref 136–145)
WBC # BLD AUTO: 7.45 10*3/MM3 (ref 3.4–10.8)

## 2020-12-09 PROCEDURE — 83036 HEMOGLOBIN GLYCOSYLATED A1C: CPT

## 2020-12-09 PROCEDURE — 36415 COLL VENOUS BLD VENIPUNCTURE: CPT

## 2020-12-09 PROCEDURE — 85027 COMPLETE CBC AUTOMATED: CPT

## 2020-12-09 PROCEDURE — 80048 BASIC METABOLIC PNL TOTAL CA: CPT

## 2020-12-09 PROCEDURE — 93010 ELECTROCARDIOGRAM REPORT: CPT | Performed by: INTERNAL MEDICINE

## 2020-12-09 PROCEDURE — 93005 ELECTROCARDIOGRAM TRACING: CPT

## 2020-12-09 NOTE — TELEPHONE ENCOUNTER
In review of his preadmission testing his serum sodium level was 132 however his glucose was also 284.  This was not a fasting specimen.  Patient does not have a primary care physician listed and according to his history has listed diabetes that is diet controlled.  Will go ahead and check a hemoglobin A1c.  Have left a message for the patient to contact me at the office

## 2020-12-09 NOTE — DISCHARGE INSTRUCTIONS
Take the following medications the morning of surgery:  NONE    Arrive to hospital on your day of surgery at 5:45 am.      If you are on prescription narcotic pain medication to control your pain you may also take that medication the morning of surgery.    General Instructions:  • Do not eat solid food after midnight the night before surgery.  • You may drink clear liquids day of surgery but must stop at least one hour before your hospital arrival time.  • It is beneficial for you to have a clear drink that contains carbohydrates the day of surgery.  We suggest a 12 to 20 ounce bottle of Gatorade or Powerade for non-diabetic patients or a 12 to 20 ounce bottle of G2 or Powerade Zero for diabetic patients. (Pediatric patients, are not advised to drink a 12 to 20 ounce carbohydrate drink)    Clear liquids are liquids you can see through.  Nothing red in color.     Plain water                               Sports drinks  Sodas                                   Gelatin (Jell-O)  Fruit juices without pulp such as white grape juice and apple juice  Popsicles that contain no fruit or yogurt  Tea or coffee (no cream or milk added)  Gatorade / Powerade  G2 / Powerade Zero    • Infants may have breast milk up to four hours before surgery.  • Infants drinking formula may drink formula up to six hours before surgery.   • Patients who avoid smoking, chewing tobacco and alcohol for 4 weeks prior to surgery have a reduced risk of post-operative complications.  Quit smoking as many days before surgery as you can.  • Do not smoke, use chewing tobacco or drink alcohol the day of surgery.   • If applicable bring your C-PAP/ BI-PAP machine.  • Bring any papers given to you in the doctor’s office.  • Wear clean comfortable clothes.  • Do not wear contact lenses, false eyelashes or make-up.  Bring a case for your glasses.   • Bring crutches or walker if applicable.  • Remove all piercings.  Leave jewelry and any other valuables at  home.  • Hair extensions with metal clips must be removed prior to surgery.  • The Pre-Admission Testing nurse will instruct you to bring medications if unable to obtain an accurate list in Pre-Admission Testing.        If you were given a blood bank ID arm band remember to bring it with you the day of surgery.    Preventing a Surgical Site Infection:  • For 2 to 3 days before surgery, avoid shaving with a razor because the razor can irritate skin and make it easier to develop an infection.    • Any areas of open skin can increase the risk of a post-operative wound infection by allowing bacteria to enter and travel throughout the body.  Notify your surgeon if you have any skin wounds / rashes even if it is not near the expected surgical site.  The area will need assessed to determine if surgery should be delayed until it is healed.  • The night prior to surgery shower using a fresh bar of anti-bacterial soap (such as Dial) and clean washcloth.  Sleep in a clean bed with clean clothing.  Do not allow pets to sleep with you.  • Shower on the morning of surgery using a fresh bar of anti-bacterial soap (such as Dial) and clean washcloth.  Dry with a clean towel and dress in clean clothing.  • Ask your surgeon if you will be receiving antibiotics prior to surgery.  • Make sure you, your family, and all healthcare providers clean their hands with soap and water or an alcohol based hand  before caring for you or your wound.    Day of surgery:  Your arrival time is approximately two hours before your scheduled surgery time.  Upon arrival, a Pre-op nurse and Anesthesiologist will review your health history, obtain vital signs, and answer questions you may have.  The only belongings needed at this time will be a list of your home medications and if applicable your C-PAP/BI-PAP machine.  A Pre-op nurse will start an IV and you may receive medication in preparation for surgery, including something to help you relax.      Please be aware that surgery does come with discomfort.  We want to make every effort to control your discomfort so please discuss any uncontrolled symptoms with your nurse.   Your doctor will most likely have prescribed pain medications.      If you are going home after surgery you will receive individualized written care instructions before being discharged.  A responsible adult must drive you to and from the hospital on the day of your surgery and stay with you for 24 hours.    If you are staying overnight following surgery, you will be transported to your hospital room following the recovery period.  [facility] has all private rooms.    If you have any questions please call Pre-Admission Testing at [phone].  Deductibles and co-payments are collected on the day of service. Please be prepared to pay the required co-pay, deductible or deposit on the day of service as defined by your plan.    Patient Education for Self-Quarantine Process    Following your COVID testing, we strongly recommend that you do not leave your home after you have been tested for COVID except to get medical care. This includes not going to work, school or to public areas.  If this is not possible for you to do please limit your activities to only required outings.  Be sure to wear a mask when you are with other people, practice social distancing and wash your hands frequently.      The following items provide additional details to keep you safe.  • Wash your hands with soap and water frequently for at least 20 seconds.   • Avoid touching your eyes, nose and mouth with unwashed hands.  • Do not share anything - utensils, towels, food from the same bowl.   • Have your own utensils, drinking glass, dishes, towels and bedding.   • Do not have visitors.   • Do use FaceTime to stay in touch with family and friends.  • You should stay in a specific room away from others if possible.   • Stay at least 6 feet away from others  in the home if you cannot have a dedicated room to yourself.   • Do not snuggle with your pet. While the CDC says there is no evidence that pets can spread COVID-19 or be infected from humans, it is probably best to avoid “petting, snuggling, being kissed or licked and sharing food (during self-quarantine)”, according to the CDC.   • Sanitize household surfaces daily. Include all high touch areas (door handles, light switches, phones, countertops, etc.)  • Do not share a bathroom with others, if possible.   • Wear a mask around others in your home if you are unable to stay in a separate room or 6 feet apart. If  you are unable to wear a mask, have your family member wear a mask if they must be within 6 feet of you.   Call your surgeon immediately if you experience any of the following symptoms:  • Sore Throat  • Shortness of Breath or difficulty breathing  • Cough  • Chills  • Body soreness or muscle pain  • Headache  • Fever  • New loss of taste or smell  • Do not arrive for your surgery ill.  Your procedure will need to be rescheduled to another time.  You will need to call your physician before the day of surgery to avoid any unnecessary exposure to hospital staff as well as other patients.

## 2020-12-10 NOTE — TELEPHONE ENCOUNTER
We need to hold off on this until he gets his diabetes under better control probably more like the first of the year

## 2020-12-10 NOTE — TELEPHONE ENCOUNTER
We should probably try to push his manipulation to the 28th and have him see a PCP and try to get his diabetes under control otherwise his frozen shoulder will return

## 2020-12-11 ENCOUNTER — LAB (OUTPATIENT)
Dept: LAB | Facility: HOSPITAL | Age: 40
End: 2020-12-11

## 2020-12-11 DIAGNOSIS — Z01.818 OTHER SPECIFIED PRE-OPERATIVE EXAMINATION: ICD-10-CM

## 2020-12-11 PROCEDURE — C9803 HOPD COVID-19 SPEC COLLECT: HCPCS

## 2020-12-11 PROCEDURE — U0004 COV-19 TEST NON-CDC HGH THRU: HCPCS

## 2020-12-11 NOTE — TELEPHONE ENCOUNTER
Have finally gotten in touch with the patient.  I have advised him that his blood sugar and hemoglobin A1c are too high and he does need to be seen for management of his diabetes.  Patient states that he does not have a primary care physician and has not been following up with anybody regarding his diabetes..  Will refer him to endocrinology.  Have advised him that we will have to postpone his surgery for Monday but will reschedule him once his hemoglobin A1c is 7 or less and when his blood sugars under better control.

## 2020-12-12 LAB — SARS-COV-2 RNA RESP QL NAA+PROBE: NOT DETECTED

## 2020-12-18 ENCOUNTER — TELEPHONE (OUTPATIENT)
Dept: ORTHOPEDIC SURGERY | Facility: CLINIC | Age: 40
End: 2020-12-18

## 2020-12-18 NOTE — TELEPHONE ENCOUNTER
He has not heard from endocrinology yet.  He did go ahead and make an appointment with PCP down at [facility] and is scheduled to see them on Tuesday of next week

## 2021-01-06 ENCOUNTER — TELEPHONE (OUTPATIENT)
Dept: ORTHOPEDIC SURGERY | Facility: CLINIC | Age: 41
End: 2021-01-06

## 2021-01-06 NOTE — TELEPHONE ENCOUNTER
Patient needs an off work note from 12- thru 12- which was the date he saw endocrinologist. OK to write note for adjustor.

## 2021-01-06 NOTE — TELEPHONE ENCOUNTER
Caller: LENKA ROBERTSON    Relationship: NURSE    Best call back number: [phone]    What form or medical record are you requesting: WORK RELEASE NOTE 12/14/20    Who is requesting this form or medical record from you: W/C    How would you like to receive the form or medical records (pick-up, mail, fax): FAX  If fax, what is the fax number: [phone] ATTN: PT NAME    Timeframe paperwork needed: ASAP    Additional notes: PT HAS MISSED A FEW DAYS OF WORK,  NEEDS TO A COPY OF WORK RELEASE NOTE DATED FROM 12/14/20 - PRESENT. IF ANY QUESTIONS, PLEASE CALL NUMBER ABOVE.

## 2021-04-02 ENCOUNTER — BULK ORDERING (OUTPATIENT)
Dept: CASE MANAGEMENT | Facility: OTHER | Age: 41
End: 2021-04-02

## 2021-04-02 DIAGNOSIS — Z23 IMMUNIZATION DUE: ICD-10-CM
